# Patient Record
Sex: MALE | Race: WHITE
[De-identification: names, ages, dates, MRNs, and addresses within clinical notes are randomized per-mention and may not be internally consistent; named-entity substitution may affect disease eponyms.]

---

## 2020-07-03 ENCOUNTER — HOSPITAL ENCOUNTER (EMERGENCY)
Dept: HOSPITAL 63 - ER | Age: 45
LOS: 1 days | Discharge: HOME | End: 2020-07-04
Payer: OTHER GOVERNMENT

## 2020-07-03 VITALS — HEIGHT: 78 IN | BODY MASS INDEX: 31.88 KG/M2 | WEIGHT: 275.58 LBS

## 2020-07-03 DIAGNOSIS — F41.9: ICD-10-CM

## 2020-07-03 DIAGNOSIS — F32.9: Primary | ICD-10-CM

## 2020-07-03 DIAGNOSIS — F43.20: ICD-10-CM

## 2020-07-03 DIAGNOSIS — K21.9: ICD-10-CM

## 2020-07-03 LAB
AMPHETAMINE/METHAMPHETAMINE: (no result)
APTT PPP: YELLOW S
BACTERIA #/AREA URNS HPF: 0 /HPF
BARBITURATES UR-MCNC: (no result) UG/ML
BASOPHILS # BLD AUTO: 0.1 X10^3/UL (ref 0–0.2)
BASOPHILS NFR BLD: 1 % (ref 0–3)
BENZODIAZ UR-MCNC: (no result) UG/L
BILIRUB UR QL STRIP: (no result)
CANNABINOIDS UR-MCNC: (no result) UG/L
COCAINE UR-MCNC: (no result) NG/ML
EOSINOPHIL NFR BLD: 0.2 X10^3/UL (ref 0–0.7)
EOSINOPHIL NFR BLD: 3 % (ref 0–3)
ERYTHROCYTE [DISTWIDTH] IN BLOOD BY AUTOMATED COUNT: 13.6 % (ref 11.5–14.5)
FIBRINOGEN PPP-MCNC: CLEAR MG/DL
GLUCOSE UR STRIP-MCNC: (no result) MG/DL
HCT VFR BLD CALC: 40.3 % (ref 39–53)
HGB BLD-MCNC: 13.7 G/DL (ref 13–17.5)
LYMPHOCYTES # BLD: 3 X10^3/UL (ref 1–4.8)
LYMPHOCYTES NFR BLD AUTO: 44 % (ref 24–48)
MCH RBC QN AUTO: 28 PG (ref 25–35)
MCHC RBC AUTO-ENTMCNC: 34 G/DL (ref 31–37)
MCV RBC AUTO: 81 FL (ref 79–100)
METHADONE SERPL-MCNC: (no result) NG/ML
MONO #: 0.6 X10^3/UL (ref 0–1.1)
MONOCYTES NFR BLD: 8 % (ref 0–9)
NEUT #: 3 X10^3UL (ref 1.8–7.7)
NEUTROPHILS NFR BLD AUTO: 45 % (ref 31–73)
NITRITE UR QL STRIP: (no result)
OPIATES UR-MCNC: (no result) NG/ML
PCP SERPL-MCNC: (no result) MG/DL
PLATELET # BLD AUTO: 182 X10^3/UL (ref 140–400)
RBC # BLD AUTO: 4.95 X10^6/UL (ref 4.3–5.7)
RBC #/AREA URNS HPF: 0 /HPF (ref 0–2)
SP GR UR STRIP: 1.02
SQUAMOUS #/AREA URNS LPF: (no result) /LPF
UROBILINOGEN UR-MCNC: 1 MG/DL
WBC # BLD AUTO: 6.8 X10^3/UL (ref 4–11)
WBC #/AREA URNS HPF: (no result) /HPF (ref 0–4)

## 2020-07-03 PROCEDURE — 80307 DRUG TEST PRSMV CHEM ANLYZR: CPT

## 2020-07-03 PROCEDURE — 82550 ASSAY OF CK (CPK): CPT

## 2020-07-03 PROCEDURE — 99285 EMERGENCY DEPT VISIT HI MDM: CPT

## 2020-07-03 PROCEDURE — 80048 BASIC METABOLIC PNL TOTAL CA: CPT

## 2020-07-03 PROCEDURE — 84443 ASSAY THYROID STIM HORMONE: CPT

## 2020-07-03 PROCEDURE — 83735 ASSAY OF MAGNESIUM: CPT

## 2020-07-03 PROCEDURE — 85025 COMPLETE CBC W/AUTO DIFF WBC: CPT

## 2020-07-03 PROCEDURE — 81001 URINALYSIS AUTO W/SCOPE: CPT

## 2020-07-03 PROCEDURE — 93005 ELECTROCARDIOGRAM TRACING: CPT

## 2020-07-03 PROCEDURE — 80329 ANALGESICS NON-OPIOID 1 OR 2: CPT

## 2020-07-03 PROCEDURE — 85610 PROTHROMBIN TIME: CPT

## 2020-07-03 PROCEDURE — 80076 HEPATIC FUNCTION PANEL: CPT

## 2020-07-03 PROCEDURE — 85730 THROMBOPLASTIN TIME PARTIAL: CPT

## 2020-07-03 PROCEDURE — 36415 COLL VENOUS BLD VENIPUNCTURE: CPT

## 2020-07-03 PROCEDURE — 71046 X-RAY EXAM CHEST 2 VIEWS: CPT

## 2020-07-03 PROCEDURE — 84484 ASSAY OF TROPONIN QUANT: CPT

## 2020-07-03 PROCEDURE — 83690 ASSAY OF LIPASE: CPT

## 2020-07-03 PROCEDURE — G0480 DRUG TEST DEF 1-7 CLASSES: HCPCS

## 2020-07-03 NOTE — RAD
PA and lateral chest.

 

HISTORY: Dyspnea

 

PA and lateral views were taken of the chest. Lungs are free of 

infiltrates. Heart is normal in size. There is no pleural effusion.

 

IMPRESSION:

1. No acute chest disease.

 

Electronically signed by: Mic Melton MD (7/3/2020 11:24 PM) [id number]

## 2020-07-03 NOTE — PHYS DOC
Past History


Past Medical History:  Anxiety, Depression, GERD


Past Surgical History:  No Surgical History


Alcohol Use:  Occasionally


Drug Use:  None





General Adult


HPI:


HPI:


".. I ve been having some anxiety..and depressed thoughts.. I did think about 

and says in a text ..the world would be a better place without me maybe...I 

never developed at suicide plan...or anything..I am really depressed about not 

seeing my father before he ..and I only learned about his death..when I was 

Google the address of his Nursing Home....".. " I do have anxiety some times.. I

ve been talking to my  and commander ...and  they advised to come in 

immediately checked out tonight...Have been having some trouble sleeping.. and 

did drink about 6 beers last night to get to sleep.. I usually dont do that..."





Patient is a 45 year old male [employer] employee and   [employer],  

who presents with above hx and complaints depression and anxiety.  Patient 

currently on active duty assignment at [facility] and following at [facility] 

for care.   Currently having some grief and regrets over missing his father's 

 and even being aware of his death on May 25.  Patient only learned about

his father's death in  when he was Googling the nursing home address.  

Patient denies any active or specific plan for suicide.  Does have access to 

firearms both as a [employer] employee and MP in the .  Patient has not had a 

previous episodes or hospitalization for depression, anxiety or psychiatric 

issues.  Denies any history of excessive frequent alcohol use.  Patient denies 

any polysubstance abuse.  Patient does have a family history of Parkinson's with

his father which required nursing home placement and mother has a history of 

anxiety and depression.  Patient denies any history of PTSD or traumatic brain 

injury during 2 tours in [place].  Patient has approximately 20 years  

service.  Patient denies any recent travel.  Up-to-date with vaccinations.  

Normally healthy.





Review of Systems:


Review of Systems:


Constitutional:  Denies fever or chills 


Eyes:  Denies change in visual acuity 


HENT:  Denies nasal congestion or sore throat 


Respiratory:  Denies cough or shortness of breath 


Cardiovascular:  Denies chest pain or edema 


GI:  Denies abdominal pain, nausea, vomiting, bloody stools or diarrhea 


: Denies dysuria 


Musculoskeletal:  Denies back pain or joint pain 


Integument:  Denies rash 


Neurologic:  Denies headache, focal weakness or sensory changes 


Endocrine:  Denies polyuria or polydipsia 


Lymphatic:  Denies swollen glands 


Psychiatric:  Hx of thoughts of  depression and complaints of anxiety





Heart Score:


HEART Score for Chest Pain:  








HEART Score for Chest Pain Response (Comments) Value


 


History Slighlty/Non-Suspicious 0


 


ECG Normal 0


 


Age < 45 0


 


Risk Factors No Risk Factors 0


 


Troponin < Normal Limit 0


 


Total  0








Risk Factors:


Risk Factors:  DM, Current or recent (<one month) smoker, HTN, HLP, family 

history of CAD, obesity.


Risk Scores:


Score 0 - 3:  2.5% MACE over next 6 weeks - Discharge Home


Score 4 - 6:  20.3% MACE over next 6 weeks - Admit for Clinical Observation


Score 7 - 10:  72.7% MACE over next 6 weeks - Early Invasive Strategies





Family History:


Family History:


Father with Parkinson's, mother with depression and anxiety





Current Medications:


Current Meds:


See nursing for home meds





Allergies:


Allergies:





Allergies








Coded Allergies Type Severity Reaction Last Updated Verified


 


  No Known Drug Allergies    16 No











Physical Exam:


PE:





Constitutional: Well developed, well nourished, no acute distress, non-toxic 

appearance. [] Appears to be in very good physical condition


HENT: Normocephalic, atraumatic, bilateral external ears normal, oropharynx 

moist, no oral exudates, nose normal. []


Eyes: PERRLA, EOMI, conjunctiva normal, no discharge. [] 


Neck: Normal range of motion, no tenderness, supple, no stridor. [] 


Cardiovascular: Bradycardia heart rate regular rhythm, no murmur []


Lungs & Thorax:  Bilateral breath sounds equal apex on auscultation []


Abdomen: Bowel sounds normal, soft, no tenderness, no masses, no pulsatile 

masses. [] 


Skin: Warm, dry, no erythema, no rash. [] 


Back: No tenderness, no CVA tenderness. [] 


Extremities: No tenderness, no cyanosis, no clubbing, ROM intact, no edema. [] 


Neurologic: Alert and oriented X 3, normal motor function, normal sensory 

function, no focal deficits noted. []


Psychologic: Affect reports anxiety and depression,, judgement normal, patient 

denies any current suicidal ideation but did think about suicide yesterday.  

Patient denies any suicide plan.





EKG:


EKG:


My interpretation EKG shows a sinus bradycardia at 56 bpm.  No acute pathology 

noted []





Radiology/Procedures:


Radiology/Procedures:


[]


                                 IMAGING REPORT





                                     Signed





PATIENT: ARAMIS HORTA   ACCOUNT: [id number]     MRN#: [id number]


: 1975           LOCATION: ER              AGE: 45


SEX: M                    EXAM DT: 20         ACCESSION#: [id number]


STATUS: PRE ER            ORD. PHYSICIAN: JAKE JACINTO MD


REASON: dyspnea


PROCEDURE: CHEST PA & LATERAL





PA and lateral chest.


 


HISTORY: Dyspnea


 


PA and lateral views were taken of the chest. Lungs are free of 


infiltrates. Heart is normal in size. There is no pleural effusion.


 


IMPRESSION:


1. No acute chest disease.


 


Electronically signed by: Mic Melton MD (7/3/2020 11:24 PM) [id number]














DICTATED AND SIGNED BY:     MIC MELTON MD


DATE:     20 2324





CC: JAKE JACINTO MD; PCP,UNKNOWN ~





Course & Med Decision Making:


Course & Med Decision Making


Pertinent Labs and Imaging studies reviewed. (See chart for details)





See tele[place]psych [place]-  [facility] [place]  Patient to keep follow-up with 

counseling centers.  Patient to return if any concerns.  Patient encouraged use 

avoid alcohol when depressed.  Patient encouraged to return anytime if any 

exacerbation of his depressive thoughts.





Impression:





1. Complaints of Depression and anxieity


2. Grief Reaction





[]





Dragon Disclaimer:


Dragon Disclaimer:


This electronic medical record was generated, in whole or in part, using a voice

 recognition dictation system.





Departure


Departure:


Disposition:  01 HOME/RESIDENCE PRIOR TO ADM


Condition:  STABLE


Referrals:  


PCP,UNKNOWN (PCP)





Justification of Admission:


Justification of Admission:


Justification of Admission Dx:  N/A





Dragon Disclaimer


This chart was dictated in whole or in part using Voice Recognition software in 

a busy, high-work load, and often noisy Emergency Department environment.  It 

may contain unintended and wholly unrecognized errors or omissions.





Dragon Disclaimer


This chart was dictated in whole or in part using Voice Recognition software in 

a busy, high-work load, and often noisy Emergency Department environment.  It 

may contain unintended and wholly unrecognized errors or omissions.





Dragon Disclaimer


This chart was dictated in whole or in part using Voice Recognition software in 

a busy, high-work load, and often noisy Emergency Department environment.  It 

may contain unintended and wholly unrecognized errors or omissions.





Dragon Disclaimer


This chart was dictated in whole or in part using Voice Recognition software in 

a busy, high-work load, and often noisy Emergency Department environment.  It 

may contain unintended and wholly unrecognized errors or omissions.











JAKE JACINTO MD            Jul 3, 2020 23:00

## 2020-07-04 VITALS — DIASTOLIC BLOOD PRESSURE: 84 MMHG | SYSTOLIC BLOOD PRESSURE: 120 MMHG

## 2020-07-04 LAB
ACETAMIN: < 2 MCG/ML (ref 10–30)
ALBUMIN SERPL-MCNC: 3.8 G/DL (ref 3.4–5)
ALP SERPL-CCNC: 58 U/L (ref 46–116)
ALT SERPL-CCNC: 26 U/L (ref 16–63)
ANION GAP SERPL CALC-SCNC: 8 MMOL/L (ref 6–14)
AST SERPL-CCNC: 20 U/L (ref 15–37)
BILIRUB DIRECT SERPL-MCNC: 0.1 MG/DL (ref 0–0.2)
BILIRUB SERPL-MCNC: 0.6 MG/DL (ref 0.2–1)
CA-I SERPL ISE-MCNC: 14 MG/DL (ref 8–26)
CALCIUM SERPL-MCNC: 8.8 MG/DL (ref 8.5–10.1)
CHLORIDE SERPL-SCNC: 102 MMOL/L (ref 98–107)
CO2 SERPL-SCNC: 27 MMOL/L (ref 21–32)
CREAT SERPL-MCNC: 1 MG/DL (ref 0.7–1.3)
ETHANOL SERPL-MCNC: < 10 MG/DL (ref 0–10)
GFR SERPLBLD BASED ON 1.73 SQ M-ARVRAT: 81.6 ML/MIN
GLUCOSE SERPL-MCNC: 100 MG/DL (ref 70–99)
LIPASE: 119 U/L (ref 73–393)
MAGNESIUM SERPL-MCNC: 1.8 MG/DL (ref 1.8–2.4)
POTASSIUM SERPL-SCNC: 3.5 MMOL/L (ref 3.5–5.1)
PROT SERPL-MCNC: 7.1 G/DL (ref 6.4–8.2)
SALIC: < 2.8 MG/DL (ref 2.8–20)
SODIUM SERPL-SCNC: 137 MMOL/L (ref 136–145)

## 2020-07-04 NOTE — EKG
[facility] [address], [place] [address]

Test Date:    2020               Test Time:    23:19:14

Pat Name:     ARAMIS HORTA             Department:   

Patient ID:   [id number]           Room:          

Gender:       M                        Technician:   

:          1975               Requested By: JAKE JACINTO

Order Number: [id number]            Reading MD:     

                                 Measurements

Intervals                              Axis          

Rate:         56                       P:            35

TX:           148                      QRS:          36

QRSD:         98                       T:            38

QT:           416                                    

QTc:          404                                    

                           Interpretive Statements

SINUS RHYTHM

NORMAL ECG

RI6.02

No previous ECG available for comparison

## 2020-07-20 ENCOUNTER — HOSPITAL ENCOUNTER (EMERGENCY)
Dept: HOSPITAL 63 - ER | Age: 45
Discharge: HOME | End: 2020-07-20
Payer: OTHER GOVERNMENT

## 2020-07-20 VITALS — SYSTOLIC BLOOD PRESSURE: 112 MMHG | DIASTOLIC BLOOD PRESSURE: 76 MMHG

## 2020-07-20 VITALS — BODY MASS INDEX: 29.51 KG/M2 | HEIGHT: 78 IN | WEIGHT: 255.07 LBS

## 2020-07-20 DIAGNOSIS — F41.9: ICD-10-CM

## 2020-07-20 DIAGNOSIS — H57.89: ICD-10-CM

## 2020-07-20 DIAGNOSIS — F32.9: ICD-10-CM

## 2020-07-20 DIAGNOSIS — R51: Primary | ICD-10-CM

## 2020-07-20 DIAGNOSIS — K21.9: ICD-10-CM

## 2020-07-20 PROCEDURE — 99283 EMERGENCY DEPT VISIT LOW MDM: CPT

## 2020-07-20 NOTE — PHYS DOC
Past History


Past Medical History:  Anxiety, Depression, GERD


Past Surgical History:  No Surgical History


Smoking:  Non-smoker


Alcohol Use:  Occasionally


Drug Use:  None





General Adult


EDM:


Chief Complaint:  HEADACHE





HPI:


HPI:





45-year-old male presents with 3-day history of headache with pain behind his 

eyes.  Denies photophobia.  Denies nausea.  Denies fever or chills.  Denies 

known trauma.  Denies neck pain.





Review of Systems:


Review of Systems:





Constitutional: Denies fever or chills 


Eyes: Denies redness; reports pain behind his eyes


HENT: Denies nasal congestion or sore throat


Respiratory: Denies cough or shortness of breath 


Cardiovascular: Denies chest pain or palpitations


GI: Denies abdominal pain, nausea, or vomiting


: Denies dysuria or hematuria


Musculoskeletal: Denies neck pain or joint pain


Integument: Denies rash or skin lesions 


Neurologic: Reports headache,; denies focal weakness or sensory changes





Complete systems were reviewed and found to be within normal limits, except as 

documented in this note.





Current Medications:


Current Meds:





Current Medications








 Medications


  (Trade)  Dose


 Ordered  Sig/Arcenio  Start Time


 Stop Time Status Last Admin


Dose Admin


 


 Acetaminophen/


 Butalbital/


 Caffeine


  (Fioricet)  1 tab  1X  ONCE  7/20/20 11:15


 7/20/20 11:16 DC  





 


 Dexamethasone


  (Decadron)  10 mg  1X  ONCE  7/20/20 11:15


 7/20/20 11:16 DC  





 


 Ketorolac


 Tromethamine


  (Toradol 30mg


 Vial)  30 mg  1X  ONCE  7/20/20 11:15


 7/20/20 11:16 DC  














Allergies:


Allergies:





Allergies








Coded Allergies Type Severity Reaction Last Updated Verified


 


  No Known Drug Allergies    6/19/16 No











Physical Exam:


PE:





Constitutional: Well developed, well nourished, no acute distress, non-toxic 

appearance


HENT: Normocephalic, atraumatic


Eyes: PERRL, EOMI, conjunctiva normal, no discharge, no nystagmus


Neck: Normal range of motion, no midline tenderness, supple, no meningeal signs


Lungs & Thorax: No respiratory distress, equal chest rise and fall


Abdomen: Soft, no tenderness


Skin: Warm, dry, no erythema, no rash


Extremities: No tenderness, ROM intact, no edema


Neurologic: Alert and oriented X 3, normal motor function, normal sensory 

function, no focal deficits noted


Psychologic: Affect normal, judgment normal





EKG:


EKG:


[]





Radiology/Procedures:


Radiology/Procedures:


[]





Course & Med Decision Making:


Course & Med Decision Making





Patient presents with headache x3 days.  Patient is neurologically intact.  

Afebrile.  No meningeal signs appreciated.





Symptomatic treatment provided.





Patient stable for discharge with outpatient follow-up with PCP. Discussed 

findings and plan with patient, who acknowledges understanding and agreement.





Dragon Disclaimer:


Dragon Disclaimer:


This electronic medical record was generated, in whole or in part, using a voice

 recognition dictation system.





Departure


Departure:


Impression:  


   Primary Impression:  


   Headache


   Qualified Codes:  R51 - Headache


Disposition:  01 HOME/RESIDENCE PRIOR TO ADM


Condition:  STABLE


Referrals:  


OVIDIO MATSON (PCP)


Patient Instructions:  Headache, FAQs





Additional Instructions:  


May also take over the counter Ibuprofen 600mg (three over the counter tabs) 

three times daily as needed for pain/headache.


Scripts


Butalb/Acetaminophen/Caffeine ([id number] -40) 1 Each Tablet


1 EACH PO Q6HRS PRN for HEADACHE, #14 TAB


   Prov: TATYANA SORIA DO         7/20/20





Justification of Admission:


Justification of Admission:


Justification of Admission Dx:  N/A











TATYANA SORIA DO             Jul 20, 2020 11:27

## 2021-01-13 ENCOUNTER — HOSPITAL ENCOUNTER (EMERGENCY)
Dept: HOSPITAL 63 - ER | Age: 46
Discharge: HOME | End: 2021-01-13
Payer: OTHER GOVERNMENT

## 2021-01-13 VITALS — WEIGHT: 255.07 LBS | BODY MASS INDEX: 34.55 KG/M2 | HEIGHT: 72 IN

## 2021-01-13 VITALS — SYSTOLIC BLOOD PRESSURE: 135 MMHG | DIASTOLIC BLOOD PRESSURE: 54 MMHG

## 2021-01-13 DIAGNOSIS — F41.9: Primary | ICD-10-CM

## 2021-01-13 DIAGNOSIS — F32.9: ICD-10-CM

## 2021-01-13 DIAGNOSIS — R07.89: ICD-10-CM

## 2021-01-13 LAB
ALBUMIN SERPL-MCNC: 3.9 G/DL (ref 3.4–5)
ALBUMIN/GLOB SERPL: 1.3 {RATIO} (ref 1–1.7)
ALP SERPL-CCNC: 60 U/L (ref 46–116)
ALT SERPL-CCNC: 65 U/L (ref 16–63)
AMPHETAMINE/METHAMPHETAMINE: (no result)
ANION GAP SERPL CALC-SCNC: 9 MMOL/L (ref 6–14)
APTT PPP: YELLOW S
AST SERPL-CCNC: 47 U/L (ref 15–37)
BACTERIA #/AREA URNS HPF: (no result) /HPF
BARBITURATES UR-MCNC: (no result) UG/ML
BASOPHILS # BLD AUTO: 0 X10^3/UL (ref 0–0.2)
BASOPHILS NFR BLD: 1 % (ref 0–3)
BENZODIAZ UR-MCNC: (no result) UG/L
BILIRUB SERPL-MCNC: 0.5 MG/DL (ref 0.2–1)
BILIRUB UR QL STRIP: (no result)
BUN/CREAT SERPL: 12 (ref 6–20)
CA-I SERPL ISE-MCNC: 11 MG/DL (ref 8–26)
CALCIUM SERPL-MCNC: 8.9 MG/DL (ref 8.5–10.1)
CANNABINOIDS UR-MCNC: (no result) UG/L
CHLORIDE SERPL-SCNC: 102 MMOL/L (ref 98–107)
CO2 SERPL-SCNC: 29 MMOL/L (ref 21–32)
COCAINE UR-MCNC: (no result) NG/ML
CREAT SERPL-MCNC: 0.9 MG/DL (ref 0.7–1.3)
EOSINOPHIL NFR BLD: 0.1 X10^3/UL (ref 0–0.7)
EOSINOPHIL NFR BLD: 2 % (ref 0–3)
ERYTHROCYTE [DISTWIDTH] IN BLOOD BY AUTOMATED COUNT: 13.3 % (ref 11.5–14.5)
FIBRINOGEN PPP-MCNC: CLEAR MG/DL
GFR SERPLBLD BASED ON 1.73 SQ M-ARVRAT: 92.1 ML/MIN
GLOBULIN SER-MCNC: 3 G/DL (ref 2.2–3.8)
GLUCOSE SERPL-MCNC: 168 MG/DL (ref 70–99)
GLUCOSE UR STRIP-MCNC: (no result) MG/DL
HCT VFR BLD CALC: 41.7 % (ref 39–53)
HGB BLD-MCNC: 14.2 G/DL (ref 13–17.5)
LYMPHOCYTES # BLD: 1.2 X10^3/UL (ref 1–4.8)
LYMPHOCYTES NFR BLD AUTO: 35 % (ref 24–48)
MCH RBC QN AUTO: 27 PG (ref 25–35)
MCHC RBC AUTO-ENTMCNC: 34 G/DL (ref 31–37)
MCV RBC AUTO: 81 FL (ref 79–100)
METHADONE SERPL-MCNC: (no result) NG/ML
MONO #: 0.4 X10^3/UL (ref 0–1.1)
MONOCYTES NFR BLD: 12 % (ref 0–9)
NEUT #: 1.7 X10^3UL (ref 1.8–7.7)
NEUTROPHILS NFR BLD AUTO: 51 % (ref 31–73)
NITRITE UR QL STRIP: (no result)
OPIATES UR-MCNC: (no result) NG/ML
PCP SERPL-MCNC: (no result) MG/DL
PLATELET # BLD AUTO: 121 X10^3/UL (ref 140–400)
POTASSIUM SERPL-SCNC: 3.9 MMOL/L (ref 3.5–5.1)
PROT SERPL-MCNC: 6.9 G/DL (ref 6.4–8.2)
RBC # BLD AUTO: 5.19 X10^6/UL (ref 4.3–5.7)
RBC #/AREA URNS HPF: (no result) /HPF (ref 0–2)
SODIUM SERPL-SCNC: 140 MMOL/L (ref 136–145)
SP GR UR STRIP: 1.01
SQUAMOUS #/AREA URNS LPF: (no result) /LPF
UROBILINOGEN UR-MCNC: 0.2 MG/DL
WBC # BLD AUTO: 3.4 X10^3/UL (ref 4–11)
WBC #/AREA URNS HPF: (no result) /HPF (ref 0–4)

## 2021-01-13 PROCEDURE — 80307 DRUG TEST PRSMV CHEM ANLYZR: CPT

## 2021-01-13 PROCEDURE — 84484 ASSAY OF TROPONIN QUANT: CPT

## 2021-01-13 PROCEDURE — 81001 URINALYSIS AUTO W/SCOPE: CPT

## 2021-01-13 PROCEDURE — 83880 ASSAY OF NATRIURETIC PEPTIDE: CPT

## 2021-01-13 PROCEDURE — 85025 COMPLETE CBC W/AUTO DIFF WBC: CPT

## 2021-01-13 PROCEDURE — 71045 X-RAY EXAM CHEST 1 VIEW: CPT

## 2021-01-13 PROCEDURE — 80053 COMPREHEN METABOLIC PANEL: CPT

## 2021-01-13 PROCEDURE — 96374 THER/PROPH/DIAG INJ IV PUSH: CPT

## 2021-01-13 PROCEDURE — 36415 COLL VENOUS BLD VENIPUNCTURE: CPT

## 2021-01-13 PROCEDURE — 96361 HYDRATE IV INFUSION ADD-ON: CPT

## 2021-01-13 PROCEDURE — 93005 ELECTROCARDIOGRAM TRACING: CPT

## 2021-01-13 PROCEDURE — 85379 FIBRIN DEGRADATION QUANT: CPT

## 2021-01-13 PROCEDURE — 99285 EMERGENCY DEPT VISIT HI MDM: CPT

## 2021-01-13 NOTE — EKG
[facility] [address], [place] [address]

Test Date:    2021               Test Time:    14:16:41

Pat Name:     ARAMIS HORTA             Department:   

Patient ID:   [id number]           Room:          

Gender:       M                        Technician:   PADMA

:          1975               Requested By: PRINCE MIN

Order Number: [id number]            Reading MD:     

                                 Measurements

Intervals                              Axis          

Rate:         66                       P:            34

AZ:           156                      QRS:          25

QRSD:         94                       T:            28

QT:           400                                    

QTc:          421                                    

                           Interpretive Statements

SINUS RHYTHM

NO SPECIFIC ECG ABNORMALITIES

RI6.02

No previous ECG available for comparison

## 2021-08-31 ENCOUNTER — HOSPITAL ENCOUNTER (EMERGENCY)
Dept: HOSPITAL 63 - ER | Age: 46
Discharge: HOME | End: 2021-08-31
Payer: OTHER GOVERNMENT

## 2021-08-31 VITALS — BODY MASS INDEX: 30.25 KG/M2 | HEIGHT: 78 IN | WEIGHT: 261.47 LBS

## 2021-08-31 VITALS — SYSTOLIC BLOOD PRESSURE: 129 MMHG | DIASTOLIC BLOOD PRESSURE: 81 MMHG
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
## 2021-08-31 DIAGNOSIS — S90.562A: ICD-10-CM
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
## 2021-08-31 DIAGNOSIS — S71.112A: Primary | ICD-10-CM

## 2021-08-31 DIAGNOSIS — S90.561A: ICD-10-CM

## 2021-08-31 PROCEDURE — 99283 EMERGENCY DEPT VISIT LOW MDM: CPT

## 2021-08-31 NOTE — PHYS DOC
Past History


Past Medical History:  Anxiety, Depression, Other


Additional Past Medical Histor:  Panic attacks


Past Surgical History:  No Surgical History


Smoking:  Non-smoker


Alcohol Use:  None


Drug Use:  None





General Adult


EDM:


Chief Complaint:  Insect bites





HPI:


HPI:


46-year-old male presents with many insect bites in the bilateral feet and 

ankles.  He has a couple scattered areas on his upper extremities.  These showed

up several days ago.  He has not spent a lot of time outside.  He did mow his 

yard about a week ago.  The rash is intensely pruritic.  The patient also 

presents for a superficial laceration of the left leg.  He was using a knife 

when it slipped and he made a cut in the left thigh.  Bleeding is controlled 

prior to arrival.  No skin gaping.  The patient's tetanus shot is within the 

last 3 years.





Review of Systems:


Review of Systems:


Constitutional:  Denies fever or chills 


Eyes:  Denies change in visual acuity 


HENT:  Denies nasal congestion or sore throat 


Respiratory:  Denies cough or shortness of breath 


Cardiovascular:  Denies chest pain or edema 


GI:  Denies abdominal pain, nausea, vomiting, bloody stools or diarrhea 


: Denies dysuria 


Musculoskeletal:  Denies back pain or joint pain 


Integument: Rash, laceration


Neurologic:  Denies headache, focal weakness or sensory changes 


Endocrine:  Denies polyuria or polydipsia 


Lymphatic:  Denies swollen glands 


Psychiatric:  Denies depression or anxiety





Allergies:


Allergies:





Allergies








Coded Allergies Type Severity Reaction Last Updated Verified


 


  No Known Drug Allergies    6/19/16 No











Physical Exam:


PE:





Constitutional: Well developed, well nourished, no acute distress, non-toxic 

appearance. []


HENT: Normocephalic, atraumatic, bilateral external ears normal, oropharynx 

moist, no oral exudates, nose normal. []


Eyes: PERRLA, EOMI, conjunctiva normal, no discharge. [] 


Neck: Normal range of motion, no tenderness, supple, no stridor. [] 


Cardiovascular:Heart rate regular rhythm, no murmur []


Lungs & Thorax:  Bilateral breath sounds clear to auscultation []


Abdomen: Bowel sounds normal, soft, no tenderness, no masses, no pulsatile 

masses. [] 


Skin: Many erythematous papules of the bilateral lower extremities with evidence

 of excoriation.  Few scattered areas on the bilateral upper extremities.  

Superficial 4 cm laceration left thigh [] 


Back: No tenderness, no CVA tenderness. [] 


Extremities: No tenderness, no cyanosis, no clubbing, ROM intact, no edema. [] 


Neurologic: Alert and oriented X 3, normal motor function, normal sensory 

function, no focal deficits noted. []


Psychologic: Affect normal, judgement normal, mood normal. []





EKG:


EKG:


[]





Radiology/Procedures:


Radiology/Procedures:


[]





Heart Score:


C/O Chest Pain:  N/A


Risk Factors:


Risk Factors:  DM, Current or recent (<one month) smoker, HTN, HLP, family 

history of CAD, obesity.


Risk Scores:


Score 0 - 3:  2.5% MACE over next 6 weeks - Discharge Home


Score 4 - 6:  20.3% MACE over next 6 weeks - Admit for Clinical Observation


Score 7 - 10:  72.7% MACE over next 6 weeks - Early Invasive Strategies





Course & Med Decision Making:


Course & Med Decision Making


Pertinent Labs and Imaging studies reviewed. (See chart for details)


The patient's laceration is very superficial and not concerning.  The skin is 

perfectly approximated without intervention.  His rash appears to be consistent 

with oak mite bites.  I will treat him with 10 days of prednisone as well as 

triamcinolone.  He is stable for discharge at this time.


[]





Dragon Disclaimer:


Dragon Disclaimer:


This electronic medical record was generated, in whole or in part, using a voice

 recognition dictation system.





Departure


Departure:


Impression:  


   Primary Impression:  


   Insect bite


   Qualified Codes:  S90.869A - Insect bite (nonvenomous), unspecified foot, 

   initial encounter; W57.XXXA - Bitten or stung by nonvenomous insect and other

    nonvenomous arthropods, initial encounter


   Additional Impression:  


   Superficial laceration of thigh


Disposition:  01 HOME / SELF CARE / HOMELESS


Condition:  STABLE


Referrals:  


JER RENDON (PCP)


Patient Instructions:  Insect Bite, Easy-to-Read


Scripts


Prednisone (PREDNISONE) 10 Mg Tablet


10 MG PO UD for PREDNISONE TAPER, #24 TAB 0 Refills


   Take 4 tablets by mouth daily for 3 days, then


   take 3 tablets by mouth daily for 2 days, then


   take 2 tablet by mouth daily for 2 days, then


   take 1 tablet by mouth daily for 2 days, then stop.


   Prov: CORRIE SOLANO DO         8/31/21 


Triamcinolone Acetonide (TRIAMCINOLONE ACETONIDE 0.1% OINT) 15 Gm Oint...g.


1 JEB TP BID PRN for ITCHING, #1 EACH 1 Refill


   Prov: CORRIE SOLANO DO         8/31/21











CORRIE SOLANO DO                 Aug 31, 2021 08:41

## 2022-01-27 ENCOUNTER — HOSPITAL ENCOUNTER (EMERGENCY)
Dept: HOSPITAL 63 - ER | Age: 47
Discharge: HOME | End: 2022-01-27
Payer: OTHER GOVERNMENT

## 2022-01-27 VITALS — WEIGHT: 261.47 LBS | BODY MASS INDEX: 30.25 KG/M2 | HEIGHT: 78 IN

## 2022-01-27 VITALS — DIASTOLIC BLOOD PRESSURE: 67 MMHG | SYSTOLIC BLOOD PRESSURE: 146 MMHG

## 2022-01-27 DIAGNOSIS — F41.9: ICD-10-CM

## 2022-01-27 DIAGNOSIS — F32.9: ICD-10-CM

## 2022-01-27 DIAGNOSIS — R07.89: Primary | ICD-10-CM

## 2022-01-27 LAB
ANION GAP SERPL CALC-SCNC: 12 MMOL/L (ref 6–14)
BASOPHILS # BLD AUTO: 0 X10^3/UL (ref 0–0.2)
BASOPHILS NFR BLD: 1 % (ref 0–3)
CA-I SERPL ISE-MCNC: 13 MG/DL (ref 8–26)
CALCIUM SERPL-MCNC: 8.8 MG/DL (ref 8.5–10.1)
CHLORIDE SERPL-SCNC: 103 MMOL/L (ref 98–107)
CO2 SERPL-SCNC: 28 MMOL/L (ref 21–32)
CREAT SERPL-MCNC: 0.9 MG/DL (ref 0.7–1.3)
EOSINOPHIL NFR BLD: 0.2 X10^3/UL (ref 0–0.7)
EOSINOPHIL NFR BLD: 4 % (ref 0–3)
ERYTHROCYTE [DISTWIDTH] IN BLOOD BY AUTOMATED COUNT: 13 % (ref 11.5–14.5)
GFR SERPLBLD BASED ON 1.73 SQ M-ARVRAT: 91.7 ML/MIN
GLUCOSE SERPL-MCNC: 98 MG/DL (ref 70–99)
HCT VFR BLD CALC: 41.3 % (ref 39–53)
HGB BLD-MCNC: 14.3 G/DL (ref 13–17.5)
LYMPHOCYTES # BLD: 2.7 X10^3/UL (ref 1–4.8)
LYMPHOCYTES NFR BLD AUTO: 50 % (ref 24–48)
MCH RBC QN AUTO: 28 PG (ref 25–35)
MCHC RBC AUTO-ENTMCNC: 35 G/DL (ref 31–37)
MCV RBC AUTO: 80 FL (ref 79–100)
MONO #: 0.5 X10^3/UL (ref 0–1.1)
MONOCYTES NFR BLD: 9 % (ref 0–9)
NEUT #: 2 X10^3UL (ref 1.8–7.7)
NEUTROPHILS NFR BLD AUTO: 36 % (ref 31–73)
PLATELET # BLD AUTO: 161 X10^3/UL (ref 140–400)
POTASSIUM SERPL-SCNC: 4.3 MMOL/L (ref 3.5–5.1)
RBC # BLD AUTO: 5.15 X10^6/UL (ref 4.3–5.7)
SODIUM SERPL-SCNC: 143 MMOL/L (ref 136–145)
WBC # BLD AUTO: 5.5 X10^3/UL (ref 4–11)

## 2022-01-27 PROCEDURE — 99285 EMERGENCY DEPT VISIT HI MDM: CPT

## 2022-01-27 PROCEDURE — 93005 ELECTROCARDIOGRAM TRACING: CPT

## 2022-01-27 PROCEDURE — 84484 ASSAY OF TROPONIN QUANT: CPT

## 2022-01-27 PROCEDURE — 85025 COMPLETE CBC W/AUTO DIFF WBC: CPT

## 2022-01-27 PROCEDURE — 80048 BASIC METABOLIC PNL TOTAL CA: CPT

## 2022-01-27 PROCEDURE — 71045 X-RAY EXAM CHEST 1 VIEW: CPT

## 2022-01-27 PROCEDURE — 36415 COLL VENOUS BLD VENIPUNCTURE: CPT

## 2022-01-27 NOTE — RAD
EXAM: Chest, single view.



HISTORY: Chest pain.



COMPARISON: 1/13/2021



FINDINGS: A frontal view of the chest is obtained. There is no infiltrate, pleural effusion or pneumo
thorax. The heart is normal in size.



IMPRESSION: No acute pulmonary finding.



Electronically signed by: Keerthi Campbell MD (1/27/2022 8:22 AM) [id number]

## 2022-01-27 NOTE — PHYS DOC
Past History


Past Medical History:  Anxiety, Depression, Other


Additional Past Medical Histor:  Panic attacks


Past Surgical History:  No Surgical History


Smoking:  Non-smoker


Alcohol Use:  Occasionally


Drug Use:  None





Adult General


Chief Complaint


Chief Complaint:  CHEST PAIN





HPI


HPI





Patient is a 46-year-old male presenting via POV for chest pain.  Reports onset 

was approximately 4 hours prior to arrival and awoke him from sleep.  States 

pain is central to chest with radiation to left breast and shoulder.  Reports 

pain is pressure in nature with symptoms waxing and waning since onset.  Denies 

any other associated symptoms.  States he was able to fall back asleep but after

reexperiencing pain shortly after waking up he presented to our ER for 

evaluation.  Admits he is otherwise healthy and an active duty  

with no diagnosed medical issues besides anxiety.  He has had chest pain like 

this in the past that has been formally evaluated in the ER and has also 

undergone provocative cardiac testing in the outpatient setting with most recent

stress test being 8 years ago and echocardiogram approximately 2 years ago.  He 

is fully vaccinated against COVID without any symptoms today, does admit most 

recent infection approximately 4 weeks prior





Review of Systems


Review of Systems


Fourteen body systems of review of systems have been reviewed. See HPI for 

pertinent positives and negative responses, other wise all other systems are 

negative, non-pertinent or non-contributory





Allergies


Allergies





Allergies








Coded Allergies Type Severity Reaction Last Updated Verified


 


  No Known Drug Allergies    8/31/21 No











Physical Exam


Physical Exam


Constitutional: Well developed, well nourished, no acute distress, non-toxic 

appearance. 


HENT: Normocephalic, atraumatic, bilateral external ears normal, oropharynx 

moist, no oral exudates, nose normal. 


Eyes: PERRLA, EOMI, conjunctiva normal, no discharge.  


Neck: Normal range of motion, no tenderness, supple, no stridor.  


Cardiovascular: Heart rate regular, sinus rhythm, no murmurs rubs or gallops.  

Chest wall nontender


Lungs & Thorax:  Bilateral breath sounds clear to auscultation 


Abdomen: Bowel sounds normal, soft, no tenderness, no masses, no pulsatile 

masses.  Nonsurgical abdomen, no peritoneal signs


Skin: Warm, dry, no erythema, no rash.  


Back: No tenderness, no CVA tenderness.  


Extremities: No tenderness, no cyanosis, no clubbing, ROM intact, no edema.  


Neurologic: Alert and oriented X 3, grossly normal motor & sensory function, no 

focal deficits noted. 


Psychologic: Anxious affect and mood





Current Patient Data


Vital Signs





                                   Vital Signs








  Date Time  Temp Pulse Resp B/P (MAP) Pulse Ox O2 Delivery O2 Flow Rate FiO2


 


1/27/22 07:27 97.6 74 16 133/76 (95) 97 Room Air  








Lab Results





Laboratory Tests








Test


 1/27/22


07:58


 


White Blood Count 5.5 x10^3/uL 


 


Red Blood Count 5.15 x10^6/uL 


 


Hemoglobin 14.3 g/dL 


 


Hematocrit 41.3 % 


 


Mean Corpuscular Volume 80 fL 


 


Mean Corpuscular Hemoglobin 28 pg 


 


Mean Corpuscular Hemoglobin


Concent 35 g/dL 





 


Red Cell Distribution Width 13.0 % 


 


Platelet Count 161 x10^3/uL 


 


Neutrophils (%) (Auto) 36 % 


 


Lymphocytes (%) (Auto) 50 % 


 


Monocytes (%) (Auto) 9 % 


 


Eosinophils (%) (Auto) 4 % 


 


Basophils (%) (Auto) 1 % 


 


Neutrophils # (Auto) 2.0 x10^3uL 


 


Lymphocytes # (Auto) 2.7 x10^3/uL 


 


Monocytes # (Auto) 0.5 x10^3/uL 


 


Eosinophils # (Auto) 0.2 x10^3/uL 


 


Basophils # (Auto) 0.0 x10^3/uL 


 


Sodium Level 143 mmol/L 


 


Potassium Level 4.3 mmol/L 


 


Chloride Level 103 mmol/L 


 


Carbon Dioxide Level 28 mmol/L 


 


Anion Gap 12 


 


Blood Urea Nitrogen 13 mg/dL 


 


Creatinine 0.9 mg/dL 


 


Estimated GFR


(Cockcroft-Gault) 91.7 





 


Glucose Level 98 mg/dL 


 


Calcium Level 8.8 mg/dL 


 


Troponin I High Sensitivity 4 ng/L 








Current Medications








 Medications


  (Trade)  Dose


 Ordered  Sig/Arcenio


 Route


 PRN Reason  Start Time


 Stop Time Status Last Admin


Dose Admin


 


 Aspirin


  (Aspirin


 Chewable)  324 mg  1X  ONCE


 PO


   1/27/22 08:00


 1/27/22 08:01 DC 1/27/22 07:54














EKG


EKG


EKG ordered and interpreted by myself at 0736 hrs. is sinus rhythm at 63 bpm, 

unremarkable intervals, no axis deviation, no acute ischemic findings, no STEMI





Radiology/Procedures


Radiology/Procedures





EXAM: Chest, single view.





HISTORY: Chest pain.





COMPARISON: 1/13/2021





FINDINGS: A frontal view of the chest is obtained. There is no infiltrate, 

pleural effusion or pneumothorax. The heart is normal in size.





IMPRESSION: No acute pulmonary finding.





Electronically signed by: Keerthi Campbell MD (1/27/2022 8:22 AM) [id number]





Heart Score


C/O Chest Pain:  Yes


HEART Score for Chest Pain:  








HEART Score for Chest Pain Response (Comments) Value


 


History Slighlty/Non-Suspicious 0


 


ECG Normal 0


 


Age < 45 0


 


Risk Factors No Risk Factors 0


 


Troponin < Normal Limit 0


 


Total  0








Risk Factors:


Risk Factors:  DM, Current or recent (<one month) smoker, HTN, HLP, family 

history of CAD, obesity.


Risk Scores:


Risk Factors:  DM, Current or recent (<one month) smoker, HTN, HLP, family hist

ory of CAD, obesity.





Course & Med Decision Making


Course & Med Decision Making


ABCs unremarkable. I disclosed entirety of ER findings and discussed most likely

diagnosis of atypical/noncardiac chest pain. Other diagnoses were discussed with

patient such as ACS, pneumonia, pneumothorax pulmonary embolism and other 

potentially life-threatening diagnoses but all deemed less likely causes of 

patient's presentation. Plan of care discussed at length with need for close 

outpatient follow-up to review today's ER visit stressed. Strict return 

precautions were also discussed at length with good understanding verbalized by 

patient.  Patient has good access to primary care physician and so, close 

outpatient follow-up for provocative cardiac testing is advised. Patient voiced 

understanding and agreement with the plan. Patient knows to come back for repeat

evaluation if concerning signs or symptoms present prior to outpatient follow-

up. Hemodynamically stable, ambulatory and well-appearing at time of 

disposition.





Dragon Disclaimer


Dragon Disclaimer


This electronic medical record was generated, in whole or in part, using a voice

recognition dictation system.





Departure


Departure:


Impression:  


   Primary Impression:  


   Chest pain


Disposition:  01 HOME / SELF CARE / HOMELESS


Condition:  STABLE


Referrals:  


JER RENDON (PCP)





Additional Instructions:  


You were seen for chest pain.  Your workup did not show any acute abnormalities 

today, but does not indicate that you do not have underlying cardiovascular 

disease. You do need to follow up with your primary doctor and potentially a 

cardiologist for further evaluation and treatment. You should return to the ED 

if you develop worsening chest pain, shortness of breath, fever, abnormal 

sweating, leg swelling, or any other new or concerning symptoms.











DOMINGO MEJIA DO                 Jan 27, 2022 07:48

## 2022-01-28 NOTE — EKG
[facility] [address], [place] [address]

Test Date:    2022               Test Time:    07:31:41

Pat Name:     ARAMIS HORTA             Department:   

Patient ID:   [id number]           Room:          

Gender:       M                        Technician:   JOANNE

:          [id number]               Requested By: DOMINGO MEJIA

Order Number: [id number]            Reading MD:   Garret Gomez

                                 Measurements

Intervals                              Axis          

Rate:         63                       P:            38

ND:           144                      QRS:          24

QRSD:         96                       T:            19

QT:           390                                    

QTc:          402                                    

                           Interpretive Statements

SINUS RHYTHM

Electronically Signed On 2022 13:02:27 CST by Garret Gomez